# Patient Record
Sex: FEMALE | URBAN - METROPOLITAN AREA
[De-identification: names, ages, dates, MRNs, and addresses within clinical notes are randomized per-mention and may not be internally consistent; named-entity substitution may affect disease eponyms.]

---

## 2020-08-28 ENCOUNTER — APPOINTMENT (RX ONLY)
Dept: URBAN - METROPOLITAN AREA CLINIC 170 | Facility: CLINIC | Age: 30
Setting detail: DERMATOLOGY
End: 2020-08-28

## 2020-08-28 VITALS — TEMPERATURE: 98.2 F

## 2020-08-28 DIAGNOSIS — L70.0 ACNE VULGARIS: ICD-10-CM

## 2020-08-28 PROCEDURE — ? TREATMENT REGIMEN

## 2020-08-28 PROCEDURE — ? PHOTO-DOCUMENTATION

## 2020-08-28 PROCEDURE — ? PRESCRIPTION

## 2020-08-28 PROCEDURE — 99202 OFFICE O/P NEW SF 15 MIN: CPT

## 2020-08-28 PROCEDURE — ? COUNSELING

## 2020-08-28 RX ORDER — CLINDAMYCIN PHOSPHATE 10 MG/ML
LOTION TOPICAL
Qty: 1 | Refills: 4 | Status: ERX | COMMUNITY
Start: 2020-08-28

## 2020-08-28 RX ADMIN — CLINDAMYCIN PHOSPHATE: 10 LOTION TOPICAL at 00:00

## 2020-08-28 ASSESSMENT — LOCATION ZONE DERM
LOCATION ZONE: FACE
LOCATION ZONE: FACE

## 2020-08-28 ASSESSMENT — LOCATION SIMPLE DESCRIPTION DERM
LOCATION SIMPLE: LEFT CHEEK
LOCATION SIMPLE: CHIN
LOCATION SIMPLE: CHIN
LOCATION SIMPLE: RIGHT CHEEK

## 2020-08-28 ASSESSMENT — LOCATION DETAILED DESCRIPTION DERM
LOCATION DETAILED: RIGHT CHIN
LOCATION DETAILED: RIGHT INFERIOR MEDIAL MALAR CHEEK
LOCATION DETAILED: LEFT CENTRAL MALAR CHEEK
LOCATION DETAILED: LEFT CHIN

## 2020-08-28 NOTE — PROCEDURE: COUNSELING
Topical Retinoid Pregnancy And Lactation Text: This medication is Pregnancy Category C. It is unknown if this medication is excreted in breast milk.
Birth Control Pills Counseling: Birth Control Pill Counseling: I discussed with the patient the potential side effects of OCPs including but not limited to increased risk of stroke, heart attack, thrombophlebitis, deep venous thrombosis, hepatic adenomas, breast changes, GI upset, headaches, and depression.  The patient verbalized understanding of the proper use and possible adverse effects of OCPs. All of the patient's questions and concerns were addressed.
Isotretinoin Pregnancy And Lactation Text: This medication is Pregnancy Category X and is considered extremely dangerous during pregnancy. It is unknown if it is excreted in breast milk.
Spironolactone Counseling: Patient advised regarding risks of diarrhea, abdominal pain, hyperkalemia, birth defects (for female patients), liver toxicity and renal toxicity. The patient may need blood work to monitor liver and kidney function and potassium levels while on therapy. The patient verbalized understanding of the proper use and possible adverse effects of spironolactone.  All of the patient's questions and concerns were addressed.
Tetracycline Counseling: Patient counseled regarding possible photosensitivity and increased risk for sunburn.  Patient instructed to avoid sunlight, if possible.  When exposed to sunlight, patients should wear protective clothing, sunglasses, and sunscreen.  The patient was instructed to call the office immediately if the following severe adverse effects occur:  hearing changes, easy bruising/bleeding, severe headache, or vision changes.  The patient verbalized understanding of the proper use and possible adverse effects of tetracycline.  All of the patient's questions and concerns were addressed. Patient understands to avoid pregnancy while on therapy due to potential birth defects.
Doxycycline Counseling:  Patient counseled regarding possible photosensitivity and increased risk for sunburn.  Patient instructed to avoid sunlight, if possible.  When exposed to sunlight, patients should wear protective clothing, sunglasses, and sunscreen.  The patient was instructed to call the office immediately if the following severe adverse effects occur:  hearing changes, easy bruising/bleeding, severe headache, or vision changes.  The patient verbalized understanding of the proper use and possible adverse effects of doxycycline.  All of the patient's questions and concerns were addressed.
High Dose Vitamin A Pregnancy And Lactation Text: High dose vitamin A therapy is contraindicated during pregnancy and breast feeding.
Use Enhanced Medication Counseling?: No
Detail Level: Zone
Tazorac Pregnancy And Lactation Text: This medication is not safe during pregnancy. It is unknown if this medication is excreted in breast milk.
Bactrim Counseling:  I discussed with the patient the risks of sulfa antibiotics including but not limited to GI upset, allergic reaction, drug rash, diarrhea, dizziness, photosensitivity, and yeast infections.  Rarely, more serious reactions can occur including but not limited to aplastic anemia, agranulocytosis, methemoglobinemia, blood dyscrasias, liver or kidney failure, lung infiltrates or desquamative/blistering drug rashes.
Azithromycin Counseling:  I discussed with the patient the risks of azithromycin including but not limited to GI upset, allergic reaction, drug rash, diarrhea, and yeast infections.
Erythromycin Counseling:  I discussed with the patient the risks of erythromycin including but not limited to GI upset, allergic reaction, drug rash, diarrhea, increase in liver enzymes, and yeast infections.
Minocycline Pregnancy And Lactation Text: This medication is Pregnancy Category D and not consider safe during pregnancy. It is also excreted in breast milk.
Benzoyl Peroxide Counseling: Patient counseled that medicine may cause skin irritation and bleach clothing.  In the event of skin irritation, the patient was advised to reduce the amount of the drug applied or use it less frequently.   The patient verbalized understanding of the proper use and possible adverse effects of benzoyl peroxide.  All of the patient's questions and concerns were addressed.
Topical Clindamycin Pregnancy And Lactation Text: This medication is Pregnancy Category B and is considered safe during pregnancy. It is unknown if it is excreted in breast milk.
Dapsone Pregnancy And Lactation Text: This medication is Pregnancy Category C and is not considered safe during pregnancy or breast feeding.
Topical Retinoid counseling:  Patient advised to apply a pea-sized amount only at bedtime and wait 30 minutes after washing their face before applying.  If too drying, patient may add a non-comedogenic moisturizer. The patient verbalized understanding of the proper use and possible adverse effects of retinoids.  All of the patient's questions and concerns were addressed.
Birth Control Pills Pregnancy And Lactation Text: This medication should be avoided if pregnant and for the first 30 days post-partum.
Isotretinoin Counseling: Patient should get monthly blood tests, not donate blood, not drive at night if vision affected, not share medication, and not undergo elective surgery for 6 months after tx completed. Side effects reviewed, pt to contact office should one occur.
Topical Sulfur Applications Pregnancy And Lactation Text: This medication is Pregnancy Category C and has an unknown safety profile during pregnancy. It is unknown if this topical medication is excreted in breast milk.
Spironolactone Pregnancy And Lactation Text: This medication can cause feminization of the male fetus and should be avoided during pregnancy. The active metabolite is also found in breast milk.
High Dose Vitamin A Counseling: Side effects reviewed, pt to contact office should one occur.
Tazorac Counseling:  Patient advised that medication is irritating and drying.  Patient may need to apply sparingly and wash off after an hour before eventually leaving it on overnight.  The patient verbalized understanding of the proper use and possible adverse effects of tazorac.  All of the patient's questions and concerns were addressed.
Bactrim Pregnancy And Lactation Text: This medication is Pregnancy Category D and is known to cause fetal risk.  It is also excreted in breast milk.
Patient Specific Counseling (Will Not Stick From Patient To Patient): Options discussed with patient, will defer most options at this time due to pregnancy
Doxycycline Pregnancy And Lactation Text: This medication is Pregnancy Category D and not consider safe during pregnancy. It is also excreted in breast milk but is considered safe for shorter treatment courses.
Minocycline Counseling: Patient advised regarding possible photosensitivity and discoloration of the teeth, skin, lips, tongue and gums.  Patient instructed to avoid sunlight, if possible.  When exposed to sunlight, patients should wear protective clothing, sunglasses, and sunscreen.  The patient was instructed to call the office immediately if the following severe adverse effects occur:  hearing changes, easy bruising/bleeding, severe headache, or vision changes.  The patient verbalized understanding of the proper use and possible adverse effects of minocycline.  All of the patient's questions and concerns were addressed.
Topical Clindamycin Counseling: Patient counseled that this medication may cause skin irritation or allergic reactions.  In the event of skin irritation, the patient was advised to reduce the amount of the drug applied or use it less frequently.   The patient verbalized understanding of the proper use and possible adverse effects of clindamycin.  All of the patient's questions and concerns were addressed.
Azithromycin Pregnancy And Lactation Text: This medication is considered safe during pregnancy and is also secreted in breast milk.
Dapsone Counseling: I discussed with the patient the risks of dapsone including but not limited to hemolytic anemia, agranulocytosis, rashes, methemoglobinemia, kidney failure, peripheral neuropathy, headaches, GI upset, and liver toxicity.  Patients who start dapsone require monitoring including baseline LFTs and weekly CBCs for the first month, then every month thereafter.  The patient verbalized understanding of the proper use and possible adverse effects of dapsone.  All of the patient's questions and concerns were addressed.
Erythromycin Pregnancy And Lactation Text: This medication is Pregnancy Category B and is considered safe during pregnancy. It is also excreted in breast milk.
Sarecycline Counseling: Patient advised regarding possible photosensitivity and discoloration of the teeth, skin, lips, tongue and gums.  Patient instructed to avoid sunlight, if possible.  When exposed to sunlight, patients should wear protective clothing, sunglasses, and sunscreen.  The patient was instructed to call the office immediately if the following severe adverse effects occur:  hearing changes, easy bruising/bleeding, severe headache, or vision changes.  The patient verbalized understanding of the proper use and possible adverse effects of sarecycline.  All of the patient's questions and concerns were addressed.
Benzoyl Peroxide Pregnancy And Lactation Text: This medication is Pregnancy Category C. It is unknown if benzoyl peroxide is excreted in breast milk.
Topical Sulfur Applications Counseling: Topical Sulfur Counseling: Patient counseled that this medication may cause skin irritation or allergic reactions.  In the event of skin irritation, the patient was advised to reduce the amount of the drug applied or use it less frequently.   The patient verbalized understanding of the proper use and possible adverse effects of topical sulfur application.  All of the patient's questions and concerns were addressed.

## 2020-08-28 NOTE — PROCEDURE: TREATMENT REGIMEN
Initiate Treatment: Acne Free Benzoyl Peroxide wash 2.5% once daily
Detail Level: Simple
Plan: May add azalaic acid gel

## 2022-06-08 NOTE — HPI: PIMPLES (ACNE)
CHIEF COMPLAINT: FOLLOW-UP OF HEADACHES    HISTORY OF PRESENT ILLNESS: Veronica Mora is here for follow-up of headaches with her son and the help of Rickey .  her at her last visit on 3/31/22.   Referral for eye exam was placed and vestibular rehab. She feels the therapy has been very help. No more episodes of dizziness or headaches.   Tpx dose increased and ubrelvy prescribed as well as MRI brain ordered showing a few white matter hyperintensities as well as low lying tonsils.   She has had significant benefit with PT. She did not increase tpx dose or try ubrelvy due to improvement with PT.        Prior visit headache index: 16/6  Current Headache (3-1   [id number] 59)      Overall headaches are much improved.        Current Medications:   HA Prevention: tpx 25  HA Rescue: ubrelvy-hasn't had to use      Previous Medications:   Anticonvulsants: Topiramate  Antidepressants: Amitriptyline  Antihistamines  Antihypertensives  Anti-nausea: Zofran  Benzodiazepines  Ergots:  Triptans: Rizatriptan, Sumatriptan  NSAIDS:  Other Analgesics:  Muscle Relaxants:  Opiods:  Supplements:  Onabotulinumtoxin A injections: Y/N             If yes, were they helpful: Y/N  Nerve blocks:  Trigger point injections:  Nonpharmacologic:  Neurostimulation:     Imaging Results:      5/3/22 MRI  BRAIN W WO CONTRAST    Per report  1. Borderline Chiari I morphology including mildly low-lying cerebellar  tonsils and crowding at the foramen magnum without pegged configuration of  the tonsils.  2. Mild frontal predominant white matter T2 FLAIR hyperintense lesions are  nonspecific but may be on the basis of chronic microvascular ischemic  change or migraine associated white matter lesions. However, considering  clinical suspicion for demyelinating disease this is not excluded from the  Differential.    2016 LP Glucose 61, -band, slight elevation in IgG, no nucleated cells    Full neurologic exam:3/31/22    Active Ambulatory Problems     Diagnosis Date 
What Type Of Note Output Would You Prefer (Optional)?: Bullet Format
Is This A New Presentation, Or A Follow-Up?: Acne
Noted   • Oligomenorrhea 02/12/2016   • Intractable persistent migraine aura without cerebral infarction and without status migrainosus 11/05/2021   • Migraine without aura and without status migrainosus, not intractable 04/28/2022   • BPPV (benign paroxysmal positional vertigo), right 04/28/2022     Resolved Ambulatory Problems     Diagnosis Date Noted   • No Resolved Ambulatory Problems     Past Medical History:   Diagnosis Date   • No pertinent past medical history        Social History     Socioeconomic History   • Marital status: /Civil Union     Spouse name: Not on file   • Number of children: Not on file   • Years of education: Not on file   • Highest education level: Not on file   Occupational History   • Not on file   Tobacco Use   • Smoking status: Never Smoker   • Smokeless tobacco: Never Used   Vaping Use   • Vaping Use: never used   Substance and Sexual Activity   • Alcohol use: No     Alcohol/week: 0.0 standard drinks   • Drug use: No   • Sexual activity: Not Currently     Partners: Male     Birth control/protection: None   Other Topics Concern   • Not on file   Social History Narrative   • Not on file     Social Determinants of Health     Financial Resource Strain: Not on file   Food Insecurity: Not on file   Transportation Needs: Not on file   Physical Activity: Not on file   Stress: Not on file   Social Connections: Not on file   Intimate Partner Violence: Not At Risk   • Social Determinants: Intimate Partner Violence Past Fear: No   • Social Determinants: Intimate Partner Violence Current Fear: No       LABS:  Lab Results   Component Value Date    CO2 27 10/29/2021    BUN 10 10/29/2021    AST 13 10/29/2021     Hemogram  Lab Results   Component Value Date    WBC 5.4 10/29/2021    WBC 4.6 08/17/2021    HGB 14.1 10/29/2021    HGB 13.7 08/17/2021    HCT 42.9 10/29/2021    HCT 42.7 08/17/2021     10/29/2021     08/17/2021       Current Medication:  Current Outpatient Medications 
  Medication   • topiramate (TOPAMAX) 25 MG tablet   • Ubrogepant 100 MG Tab   • metoCLOPramide (REGLAN) 10 MG tablet   • ondansetron (Zofran) 4 MG tablet   • acetaminophen (TYLENOL) 325 MG tablet   • naproxen (NAPROSYN) 500 MG tablet   • rizatriptan (MAXALT-MLT) 10 MG disintegrating tablet     No current facility-administered medications for this visit.        Vitals:    06/08/22 1150   BP: 118/72   Pulse: 60       Physical Exam  CONSTITUTIONAL: May Oo appears stated age, well nourished, and in no acute distress.   NEURO: face appears grossly symmetric   Gait: not antalgic, no ataxia  PSYCHOLOGICAL: normal mood and affect; speech is normal.     ASSESSMENT:   1. Chiari malformation type I (CMS/HCC)    2. Vestibular migraine    3. Benign paroxysmal positional vertigo due to bilateral vestibular disorder    4. Abnormal brain MRI      Patient has had significant benefit with vestibular rehab and has done home exercises. She had so much benefit that she did not increase her tpx or has not tried ubrelvy.   We reviewed imaging together. Looking back she did have an LP in 2016 which was negative for oligoclonal bands with only mild elevation in IgG.    I am still confused at how she got to the point of LP. Looking in the notes there is a radiology encounter for the LP and a ED for what looks like post-LP headache but I am unable to find any previous head imaging or note following up on the LP. Unfortunately the patient doesn't recall.     At this point, would just make note of Chiari as her vertiginous symptoms have resolved as well as headaches which would be unusual if her symptoms were secondary to Chiari.      PLAN:   Patient Instructions   1. Prevention: Can try stopping the topiramate. If headaches return can restart 25mg.       Rescue:  Ubrelvy 100mg, take 1 tab at onset of headache. Can repeat in 2 hours if needed. Do not use more than 200mg/24hours. May cause somnolence and nausea. DO NOT START TAKING as strong 
CY inhibitors, such as ketoconazole, clarithromycin, itraconazole while taking Ubrelvy or take with grapefruit juice or with THC/marijuana products.    2. Follow-up in 6months  3. Keep headache calender  4. Avoid skipping meals, try and identify food triggers by keeping a food log to bring to next appointment.   5.. Exercise at least 30 minutes/day for at least 4 days per week.   6. Keep regular sleep habits and awake at the same time daily.   7. If labs or imaging was ordered, please complete these as soon as possible.    Call clinic with any change or worsening of your symptoms or with any questions or concerns.  All medication refills must be made during normal business hours.   If you are pregnant or have suspicion that you are pregnant, please call our office immediately as certain medications are not safe during pregnancy.     Our goal is to provide you with excellent health care. I  would like to thank you for entrusting me with your care today. If there is anything that we can do to serve you better, please let us know. You may receive a survey asking about your experience with us, and we would appreciate your time in completing and returning the survey. Your input will assist us in improving the care we deliver.          The above treatment plan was outlined and discussed with the patient. She verbalized understanding of the instructions.      45min This includes pre-charting, chart review, documenting, and referring/communicating with other health care professionals.